# Patient Record
Sex: FEMALE | Race: BLACK OR AFRICAN AMERICAN | NOT HISPANIC OR LATINO | Employment: UNEMPLOYED | URBAN - METROPOLITAN AREA
[De-identification: names, ages, dates, MRNs, and addresses within clinical notes are randomized per-mention and may not be internally consistent; named-entity substitution may affect disease eponyms.]

---

## 2018-04-27 LAB — POC BETA-HCG (QUAL): NEGATIVE

## 2019-11-08 LAB — POC BETA-HCG (QUAL): NEGATIVE

## 2021-04-16 LAB — POC BETA-HCG (QUAL): NEGATIVE

## 2022-04-11 ENCOUNTER — HISTORICAL (OUTPATIENT)
Dept: ADMINISTRATIVE | Facility: HOSPITAL | Age: 32
End: 2022-04-11

## 2022-04-28 VITALS
DIASTOLIC BLOOD PRESSURE: 71 MMHG | BODY MASS INDEX: 30.11 KG/M2 | OXYGEN SATURATION: 95 % | HEIGHT: 64 IN | WEIGHT: 176.38 LBS | SYSTOLIC BLOOD PRESSURE: 101 MMHG

## 2022-09-21 ENCOUNTER — HISTORICAL (OUTPATIENT)
Dept: ADMINISTRATIVE | Facility: HOSPITAL | Age: 32
End: 2022-09-21

## 2023-06-01 ENCOUNTER — HOSPITAL ENCOUNTER (EMERGENCY)
Facility: HOSPITAL | Age: 33
Discharge: HOME | End: 2023-06-01
Attending: EMERGENCY MEDICINE
Payer: COMMERCIAL

## 2023-06-01 VITALS
SYSTOLIC BLOOD PRESSURE: 136 MMHG | HEART RATE: 67 BPM | RESPIRATION RATE: 18 BRPM | OXYGEN SATURATION: 100 % | WEIGHT: 185 LBS | HEIGHT: 63 IN | DIASTOLIC BLOOD PRESSURE: 87 MMHG | TEMPERATURE: 97 F | BODY MASS INDEX: 32.78 KG/M2
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
## 2023-06-01 DIAGNOSIS — S46.912A STRAIN OF LEFT SHOULDER, INITIAL ENCOUNTER: ICD-10-CM

## 2023-06-01 PROCEDURE — 63600175 PHARM REV CODE 636 W HCPCS: Performed by: EMERGENCY MEDICINE

## 2023-06-01 PROCEDURE — 96372 THER/PROPH/DIAG INJ SC/IM: CPT | Performed by: EMERGENCY MEDICINE

## 2023-06-01 PROCEDURE — 99284 EMERGENCY DEPT VISIT MOD MDM: CPT

## 2023-06-01 RX ORDER — CYCLOBENZAPRINE HCL 10 MG
10 TABLET ORAL 3 TIMES DAILY PRN
Qty: 15 TABLET | Refills: 0 | Status: SHIPPED | OUTPATIENT
Start: 2023-06-01 | End: 2023-06-06

## 2023-06-01 RX ORDER — DICLOFENAC SODIUM 10 MG/G
2 GEL TOPICAL
COMMUNITY
Start: 2023-04-23

## 2023-06-01 RX ORDER — KETOROLAC TROMETHAMINE 30 MG/ML
30 INJECTION, SOLUTION INTRAMUSCULAR; INTRAVENOUS
Status: COMPLETED | OUTPATIENT
Start: 2023-06-01 | End: 2023-06-01

## 2023-06-01 RX ADMIN — KETOROLAC TROMETHAMINE 30 MG: 30 INJECTION, SOLUTION INTRAMUSCULAR; INTRAVENOUS at 10:06
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
## 2024-04-24 DIAGNOSIS — M79.672 LEFT FOOT PAIN: Primary | ICD-10-CM

## 2024-05-30 ENCOUNTER — OFFICE VISIT (OUTPATIENT)
Dept: ORTHOPEDICS | Facility: CLINIC | Age: 34
End: 2024-05-30

## 2024-05-30 ENCOUNTER — HOSPITAL ENCOUNTER (OUTPATIENT)
Dept: RADIOLOGY | Facility: HOSPITAL | Age: 34
Discharge: HOME | End: 2024-05-30
Attending: FAMILY MEDICINE

## 2024-05-30 VITALS
HEART RATE: 73 BPM | SYSTOLIC BLOOD PRESSURE: 127 MMHG | WEIGHT: 185.63 LBS | DIASTOLIC BLOOD PRESSURE: 84 MMHG | BODY MASS INDEX: 32.89 KG/M2 | HEIGHT: 63 IN

## 2024-05-30 DIAGNOSIS — M76.62 ACHILLES TENDINITIS OF LEFT LOWER EXTREMITY: Primary | ICD-10-CM

## 2024-05-30 DIAGNOSIS — M79.672 LEFT FOOT PAIN: ICD-10-CM

## 2024-05-30 PROCEDURE — 73630 X-RAY EXAM OF FOOT: CPT | Mod: TC,LT

## 2024-05-30 PROCEDURE — 99213 OFFICE O/P EST LOW 20 MIN: CPT | Mod: PBBFAC,25

## 2024-05-30 RX ORDER — LORATADINE 10 MG/1
10 TABLET ORAL
COMMUNITY
Start: 2024-04-18

## 2024-05-30 RX ORDER — NAPROXEN 500 MG/1
500 TABLET ORAL 2 TIMES DAILY WITH MEALS
Qty: 60 TABLET | Refills: 0 | Status: SHIPPED | OUTPATIENT
Start: 2024-05-30 | End: 2024-06-29

## 2024-05-30 RX ORDER — MELOXICAM 7.5 MG/1
7.5 TABLET ORAL
COMMUNITY
Start: 2024-04-17

## 2024-05-30 NOTE — PROGRESS NOTES
"Subjective:    Patient ID: Jocelyn Schreiber is a 33 y.o. female  who presented to [facility] for new visit.      Chief Complaint: Pain of the Left Foot      History of Present Illness:  Jocelyn Schreiber presents to the clinic complaining of acute left atraumatic foot pain onset 2 months ago. Pain is currently located at the distal insertion of the left Achilles tendon, 9/10, constant throughout the day, worse with ambulation, and improves with rest. Treatment to date: mobic 7.5mg. Imaging to date: radiograph.  She admits that she has been working as a  for the past 6 year where he performs significant amount of prolonged ambulation which exacerbates her symptoms.  Expectations today:  Pain relief.    Ankle/Foot Review of Systems:  Swelling?  no  Instability?  no  Mechanical sx?  no  <30 min AM stiffness? no  Limited ROM? no  Fever/Chills? no       Objective:      Physical Exam:    /84   Pulse 73   Ht 5' 3" (1.6 m)   Wt 84.2 kg (185 lb 10 oz)   BMI 32.88 kg/m²     Ortho/SPM Exam    Appearance:  Normal gait/station  FWB      Soft tissue swelling: Left: no Right: no  Effusion: Left:  Negative Right: Negative  Erythema: Left no Right: no  Ecchymosis: Left: no Right: no  Atrophy: Left: no Right: no    Palpation:  Ankle/Foot Tenderness: Left:  Distal insertion of the Achilles tendon Right: non tender.    Range of motion:  Ankle dorsiflexion (20 degrees):     Left: 20 Right: 20  Ankle Plantar flexion (50 degrees): Left 50 Right: 50  Subtalar inversion (5 degrees): Left: 5 Right 5  Subtalar eversion (5 degrees):  Left: 5 Right 5  Transverse/midtarsal: adduction (20 degrees): Left: 20 Right: 20  Transverse/midtarsal abduction (10 degrees): Left: 10 Right: 10    Strength:  Foot inversion/dorsiflexion (Deep Peroneal N. L4):Left: 5/5  Pain: no Right: 5/5  Pain: no  1st toe Dorsiflexion (Deep Peroneal N. L5): Left: 5/5  Pain: no Right: 5/5  Pain: " no  Foot plantarflexion (Tibial N. S1): Left: 5/5  Pain: yes Right: 5/5  Pain: no  Foot eversion (superficial peroneal L4-S1): Left: 5/5  Pain: no Right: 5/5  Pain: no      Special Tests:  Mehta:  Left: Negative     Right: Negative   Anterior drawer: Left: Negative     Right: Negative   Talar tilt: Left: Negative      Right: Negative   External rotation stress (Kleiger's): Left: Negative  Right: Negative  Eversion stress: Left: Negative Right: Negative   Squeeze: Left: Negative  Right: Negative  Heel rise: Left: Not performed Right: Not performed  Too many toes sign: Left: Not performed Right: Not performed  Negative left windlass test.      General appearance: NAD  Peripheral pulses: normal bilaterally   Reflexes: Left: normal Right normal   Sensation: normal    Labs:  Last A1c: The patient doesn't have any registry metric data available     Imaging:   Previous images reviewed.  X-rays ordered and performed today: yes  # of views: 3 Laterality: left  My Interpretation:  Small enthesophyte noted at the distal insertion of the Achilles tendon.  No acute fractures or dislocations noted.      Assessment:        Encounter Diagnoses   Code Name Primary?    M76.62 Achilles tendinitis of left lower extremity Yes        Plan:    MDM: Prior external referring provider notes reviewed. Prior external referring provider studies reviewed.   Dx:  Left achilles tendonitis, new problem  Treatment Plan: Discussed with patient diagnosis, prognosis, and treatment recommendations. Education provided.  Patient meets nonsurgical criteria. Discussed conservative management including avoiding aggravating activities, activity modification, rest, discontinue mobic and start naproxen, heel lift, insoles, cold and hot therapy, HEP, formal PT, and to follow up with us in 8 weeks.  Patient declined formal PT.  Imaging: radiological studies ordered and independently reviewed; discussed with patient; pending radiologist interpretation.   Weight  Management: is paramount. Recommend at least 10% of total body weight loss if your BMI is 30-34.9. A BMI of <24.9 may provide further relief..   Activity: Activity as tolerated; HEP to include aerobic conditioning and strength training with non-painful activity. ROM/STG exercises. Proper footware; assistive devises to avoid limping.   Therapy: No formal therapy  Medication: START naproxen 500 mg every 12 hours as needed for pain . Please see your primary care physician for further refills.  RTC:  8 weeks.        This note is dictated using the M*Modal Fluency Direct word recognition program. There are word recognition mistakes that are occasionally missed on review.    Osvaldo Goins D.O.  Sports Medicine Fellow

## 2024-05-30 NOTE — LETTER
May 30, 2024      [facility]  [facility] [facility] [address]  Phone: [phone]       Patient: Jocelyn Schreiber   YOB: 1990  Date of Visit: 05/30/2024    To Whom It May Concern:    Nancy Schreiber  was at [facility] on 05/30/2024. The patient may return to work/school on 05/30/24 Patient is to be on light duty until next visit. We ask that she follow our recommendation of no prolonged standing/walking for longer than 10-15 minutes светлана  time. We also ask you to allow her to have frequent breaks between standing/walking as well. If you have any questions or concerns, or if I can be of further assistance, please do not hesitate to contact me.    Sincerely,  MD Mary Espitia MA

## 2024-05-31 NOTE — PROGRESS NOTES
Faculty Attestation: Jocelyn AZAEL Schreiber  was seen in [facility] Clinic. Discussed with Dr. Goins at the time of the visit. History of Present Illness, Physical Exam, and Assessment and Plan reviewed. Treatment plan is reasonable and appropriate. Compliance with treatment recommendations is important.  Radiology images independently reviewed and agree with radiologist interpretation. No procedure was performed.     Maday Reyes MD  Sports Medicine

## 2024-07-30 ENCOUNTER — OFFICE VISIT (OUTPATIENT)
Dept: ORTHOPEDICS | Facility: CLINIC | Age: 34
End: 2024-07-30

## 2024-07-30 VITALS
HEIGHT: 63 IN | HEART RATE: 76 BPM | TEMPERATURE: 98 F | DIASTOLIC BLOOD PRESSURE: 74 MMHG | BODY MASS INDEX: 33.25 KG/M2 | WEIGHT: 187.63 LBS | SYSTOLIC BLOOD PRESSURE: 109 MMHG

## 2024-07-30 DIAGNOSIS — M76.62 ACHILLES TENDINITIS OF LEFT LOWER EXTREMITY: Primary | ICD-10-CM

## 2024-07-30 PROCEDURE — 99213 OFFICE O/P EST LOW 20 MIN: CPT | Mod: PBBFAC

## 2024-07-30 RX ORDER — ERGOCALCIFEROL 1.25 MG/1
50000 CAPSULE ORAL
COMMUNITY
Start: 2024-05-02

## 2024-07-30 NOTE — PROGRESS NOTES
"Subjective:    Patient ID: Jocelyn Schreiber is a 33 y.o. female  who presented to [facility] for follow up.      Chief Complaint: Pain of the Left Foot      History of Present Illness:  Jocelyn Schreiber presents to the clinic complaining left atraumatic foot pain onset 4 months ago.  She was last seen by me on May 3, 2024 and was instructed to wear insoles/heel lift and start naproxen, she declined formal physical therapy at that time. She admits to compliance with insoles and heel lift. She admits her pain has overall has improved. Her pain is currently 7/10 at the distal insertion of the left achilles tendon, worse with ambulation, and improves with rest, insoles, and naproxen.  Treatment today: insoles, heel lift, Mobic and home exercise program.  Imaging to date: Radiographs.  She admits overall her pain has improved.    Ankle/Foot Review of Systems:  Swelling?  no  Instability?  no  Mechanical sx?  no  <30 min AM stiffness? no  Limited ROM? no  Fever/Chills? no       Objective:      Physical Exam:    /74   Pulse 76   Temp 97.6 °F (36.4 °C)   Ht 5' 3" (1.6 m)   Wt 85.1 kg (187 lb 9.8 oz)   BMI 33.23 kg/m²     Ortho/SPM Exam    Appearance:  Normal gait/station  FWB      Soft tissue swelling: Left: no Right: no  Effusion: Left:  Negative Right: Negative  Erythema: Left no Right: no  Ecchymosis: Left: no Right: no  Atrophy: Left: no Right: no    Palpation:  Ankle/Foot Tenderness: Left:  Distal insertion of the Achilles tendon Right: non tender.    Range of motion:  Ankle dorsiflexion (20 degrees):     Left: 20 Right: 20  Ankle Plantar flexion (50 degrees): Left 50 Right: 50  Subtalar inversion (5 degrees): Left: 5 Right 5  Subtalar eversion (5 degrees):  Left: 5 Right 5  Transverse/midtarsal: adduction (20 degrees): Left: 20 Right: 20  Transverse/midtarsal abduction (10 degrees): Left: 10 Right: 10    Strength:  Foot inversion/dorsiflexion (Deep " Peroneal N. L4):Left: 5/5  Pain: no Right: 5/5  Pain: no  1st toe Dorsiflexion (Deep Peroneal N. L5): Left: 5/5  Pain: no Right: 5/5  Pain: no  Foot plantarflexion (Tibial N. S1): Left: 5/5  Pain: no Right: 5/5  Pain: no  Foot eversion (superficial peroneal L4-S1): Left: 5/5  Pain: no Right: 5/5  Pain: no      Special Tests:  Mehta:  Left: Not performed     Right: Not performed   Anterior drawer: Left: Negative     Right: Negative   Talar tilt: Left: Negative      Right: Negative   External rotation stress (Kleiger's): Left: Negative  Right: Negative  Eversion stress: Left: Negative Right: Negative   Squeeze: Left: Negative  Right: Negative  Heel rise: Left: Not performed Right: Not performed  Too many toes sign: Left: Not performed Right: Not performed      General appearance: NAD  Peripheral pulses: normal bilaterally   Reflexes: Left: normal Right normal   Sensation: normal    Labs:  Last A1c: The patient doesn't have any registry metric data available     Imaging:   Previous images reviewed.  X-rays ordered and performed today: no      My interpretation of the left foot x-ray from May 3, 2024: Small enthesophyte noted at the distal insertion of the Achilles tendon.  No acute fractures or dislocations noted.    Assessment:        Encounter Diagnoses   Code Name Primary?    M76.62 Achilles tendinitis of left lower extremity Yes        Plan:    Dx:  Left Achilles tendonitis, overall improving    Treatment Plan: Discussed with patient diagnosis, prognosis, and treatment recommendations. Education provided.  Discussed conservative management including avoiding aggravating activities, activity modification, oral/topical analgesics, cold and hot therapy, HEP, formal PT, and to follow up with us as needed.  Declined formal physical therapy.  She is self-pay and we have discussed alternative treatment options such as PRP with Dr. Lora and MATT.  Imaging: prior radiological studies independently reviewed; discussed  with patient; agree with radiologist interpretation.   Weight Management: is paramount. Recommend at least 10% of total body weight loss if your BMI is 30-34.9. A BMI of <24.9 may provide further relief..   Activity: Activity as tolerated; HEP to include aerobic conditioning and strength training with non-painful activity. ROM/STG exercises. Proper footware; assistive devises to avoid limping.  Continue heel lift/.  insoles  Therapy:  Declined formal PT.  Continue home exercise program.  Medication: START over-the-counter acetaminophen (Tylenol 1000 mg three times per day as needed)  CONTINUE previously prescribed medication Naproxen 500mg q12h prn for pain . Please see your primary care physician for further refills.  RTC: PRN; call if any issues.        This note is dictated using the M*Modal Fluency Direct word recognition program. There are word recognition mistakes that are occasionally missed on review.    Osvaldo Goins D.O.  Sports Medicine Fellow

## 2024-07-30 NOTE — LETTER
July 30, 2024      [facility]  [address] [place] [address]  Phone: [phone]       Patient: Jocelyn Schreiber   YOB: 1990  Date of Visit: 07/30/2024    To Whom It May Concern:    Nancy Schreiber  was at [facility] on 07/30/2024. The patient may return to work 7/30/24. If you have any questions or concerns, or if I can be of further assistance, please do not hesitate to contact me.    Sincerely,  Osvaldo Goins, DO      17-Nov-2022 03:03

## 2025-01-13 ENCOUNTER — HOSPITAL ENCOUNTER (EMERGENCY)
Facility: HOSPITAL | Age: 35
Discharge: HOME | End: 2025-01-13
Attending: EMERGENCY MEDICINE
Payer: COMMERCIAL

## 2025-01-13 VITALS
DIASTOLIC BLOOD PRESSURE: 88 MMHG | BODY MASS INDEX: 32.25 KG/M2 | HEART RATE: 100 BPM | SYSTOLIC BLOOD PRESSURE: 133 MMHG | HEIGHT: 63 IN | WEIGHT: 182 LBS | OXYGEN SATURATION: 100 % | RESPIRATION RATE: 16 BRPM | TEMPERATURE: 98 F

## 2025-01-13 DIAGNOSIS — T07.XXXA MULTIPLE ABRASIONS: ICD-10-CM

## 2025-01-13 DIAGNOSIS — S43.402A SPRAIN OF LEFT SHOULDER, UNSPECIFIED SHOULDER SPRAIN TYPE, INITIAL ENCOUNTER: Primary | ICD-10-CM

## 2025-01-13 DIAGNOSIS — S60.219A: ICD-10-CM
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
## 2025-01-13 LAB — B-HCG UR QL: NEGATIVE

## 2025-01-13 PROCEDURE — 25000003 PHARM REV CODE 250: Performed by: EMERGENCY MEDICINE

## 2025-01-13 PROCEDURE — 99283 EMERGENCY DEPT VISIT LOW MDM: CPT | Mod: 25

## 2025-01-13 PROCEDURE — 81025 URINE PREGNANCY TEST: CPT | Performed by: EMERGENCY MEDICINE

## 2025-01-13 RX ORDER — IBUPROFEN 800 MG/1
800 TABLET ORAL EVERY 8 HOURS PRN
Qty: 20 TABLET | Refills: 0 | Status: SHIPPED | OUTPATIENT
Start: 2025-01-13

## 2025-01-13 RX ORDER — HYDROCODONE BITARTRATE AND ACETAMINOPHEN 10; 325 MG/1; MG/1
1 TABLET ORAL
Status: COMPLETED | OUTPATIENT
Start: 2025-01-13 | End: 2025-01-13

## 2025-01-13 RX ORDER — HYDROCODONE BITARTRATE AND ACETAMINOPHEN 5; 325 MG/1; MG/1
1 TABLET ORAL EVERY 6 HOURS PRN
Qty: 6 TABLET | Refills: 0 | Status: SHIPPED | OUTPATIENT
Start: 2025-01-13

## 2025-01-13 RX ORDER — ONDANSETRON 4 MG/1
4 TABLET, ORALLY DISINTEGRATING ORAL
Status: COMPLETED | OUTPATIENT
Start: 2025-01-13 | End: 2025-01-13

## 2025-01-13 RX ADMIN — ONDANSETRON 4 MG: 4 TABLET, ORALLY DISINTEGRATING ORAL at 04:01

## 2025-01-13 RX ADMIN — HYDROCODONE BITARTRATE AND ACETAMINOPHEN 1 TABLET: 10; 325 TABLET ORAL at 03:01
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
## 2025-01-13 NOTE — Clinical Note
"Jocelyn Gómezenriqueta Schreiber was seen and treated in our emergency department on 1/13/2025.  She may return to work on 01/16/2025.       If you have any questions or concerns, please don't hesitate to call.      Codi CHAVIRA    "

## 2025-06-18 DIAGNOSIS — R87.612 LGSIL ON PAP SMEAR OF CERVIX: Primary | ICD-10-CM

## 2025-06-18 DIAGNOSIS — B97.7 HPV (HUMAN PAPILLOMA VIRUS) INFECTION: ICD-10-CM

## 2025-07-30 ENCOUNTER — HOSPITAL ENCOUNTER (EMERGENCY)
Facility: HOSPITAL | Age: 35
Discharge: HOME | End: 2025-07-30
Attending: EMERGENCY MEDICINE

## 2025-07-30 VITALS
DIASTOLIC BLOOD PRESSURE: 75 MMHG | WEIGHT: 180 LBS | HEIGHT: 64 IN | SYSTOLIC BLOOD PRESSURE: 114 MMHG | HEART RATE: 68 BPM | RESPIRATION RATE: 18 BRPM | OXYGEN SATURATION: 99 % | BODY MASS INDEX: 30.73 KG/M2 | TEMPERATURE: 98 F

## 2025-07-30 DIAGNOSIS — M94.0 COSTOCHONDRITIS: ICD-10-CM

## 2025-07-30 DIAGNOSIS — R07.89 CHEST WALL PAIN: Primary | ICD-10-CM

## 2025-07-30 DIAGNOSIS — R07.9 CHEST PAIN: ICD-10-CM

## 2025-07-30 LAB
ALBUMIN SERPL-MCNC: 3.8 G/DL (ref 3.5–5)
ALBUMIN/GLOB SERPL: 0.9 RATIO (ref 1.1–2)
ALP SERPL-CCNC: 85 UNIT/L (ref 40–150)
ALT SERPL-CCNC: 12 UNIT/L (ref 0–55)
ANION GAP SERPL CALC-SCNC: 6 MEQ/L
AST SERPL-CCNC: 16 UNIT/L (ref 11–45)
B-HCG UR QL: NEGATIVE
BASOPHILS # BLD AUTO: 0.04 X10(3)/MCL
BASOPHILS NFR BLD AUTO: 0.5 %
BILIRUB SERPL-MCNC: 0.4 MG/DL
BUN SERPL-MCNC: 9.6 MG/DL (ref 7–18.7)
CALCIUM SERPL-MCNC: 8.9 MG/DL (ref 8.4–10.2)
CHLORIDE SERPL-SCNC: 108 MMOL/L (ref 98–107)
CK SERPL-CCNC: 74 U/L (ref 29–168)
CO2 SERPL-SCNC: 25 MMOL/L (ref 22–29)
CREAT SERPL-MCNC: 0.67 MG/DL (ref 0.55–1.02)
CREAT/UREA NIT SERPL: 14
D DIMER PPP IA.FEU-MCNC: <=0.27 UG/ML FEU (ref 0–0.5)
EOSINOPHIL # BLD AUTO: 0.11 X10(3)/MCL (ref 0–0.9)
EOSINOPHIL NFR BLD AUTO: 1.3 %
ERYTHROCYTE [DISTWIDTH] IN BLOOD BY AUTOMATED COUNT: 13.7 % (ref 11.5–17)
GFR SERPLBLD CREATININE-BSD FMLA CKD-EPI: >60 ML/MIN/1.73/M2
GLOBULIN SER-MCNC: 4.2 GM/DL (ref 2.4–3.5)
GLUCOSE SERPL-MCNC: 86 MG/DL (ref 74–100)
HCT VFR BLD AUTO: 35.3 % (ref 37–47)
HGB BLD-MCNC: 11.7 G/DL (ref 12–16)
IMM GRANULOCYTES # BLD AUTO: 0.01 X10(3)/MCL (ref 0–0.04)
IMM GRANULOCYTES NFR BLD AUTO: 0.1 %
INR PPP: 1.3
LYMPHOCYTES # BLD AUTO: 3.18 X10(3)/MCL (ref 0.6–4.6)
LYMPHOCYTES NFR BLD AUTO: 38.6 %
MCH RBC QN AUTO: 29.2 PG (ref 27–31)
MCHC RBC AUTO-ENTMCNC: 33.1 G/DL (ref 33–36)
MCV RBC AUTO: 88 FL (ref 80–94)
MONOCYTES # BLD AUTO: 0.61 X10(3)/MCL (ref 0.1–1.3)
MONOCYTES NFR BLD AUTO: 7.4 %
NEUTROPHILS # BLD AUTO: 4.28 X10(3)/MCL (ref 2.1–9.2)
NEUTROPHILS NFR BLD AUTO: 52.1 %
NRBC BLD AUTO-RTO: 0 %
NT-PROBNP SERPL-MCNC: <16 PG/ML
OHS QRS DURATION: 82 MS
OHS QTC CALCULATION: 451 MS
PLATELET # BLD AUTO: 433 X10(3)/MCL (ref 130–400)
PMV BLD AUTO: 9 FL (ref 7.4–10.4)
POTASSIUM SERPL-SCNC: 3.6 MMOL/L (ref 3.5–5.1)
PROT SERPL-MCNC: 8 GM/DL (ref 6.4–8.3)
PROTHROMBIN TIME: 16.4 SECONDS (ref 11.7–14.5)
RBC # BLD AUTO: 4.01 X10(6)/MCL (ref 4.2–5.4)
SODIUM SERPL-SCNC: 139 MMOL/L (ref 136–145)
TROPONIN I SERPL HS-MCNC: <3 NG/L
WBC # BLD AUTO: 8.23 X10(3)/MCL (ref 4.5–11.5)

## 2025-07-30 PROCEDURE — 85025 COMPLETE CBC W/AUTO DIFF WBC: CPT | Performed by: EMERGENCY MEDICINE

## 2025-07-30 PROCEDURE — 83880 ASSAY OF NATRIURETIC PEPTIDE: CPT | Performed by: EMERGENCY MEDICINE

## 2025-07-30 PROCEDURE — 93005 ELECTROCARDIOGRAM TRACING: CPT

## 2025-07-30 PROCEDURE — 81025 URINE PREGNANCY TEST: CPT | Performed by: EMERGENCY MEDICINE

## 2025-07-30 PROCEDURE — 80053 COMPREHEN METABOLIC PANEL: CPT | Performed by: EMERGENCY MEDICINE

## 2025-07-30 PROCEDURE — 82550 ASSAY OF CK (CPK): CPT | Performed by: EMERGENCY MEDICINE

## 2025-07-30 PROCEDURE — 99285 EMERGENCY DEPT VISIT HI MDM: CPT | Mod: 25

## 2025-07-30 PROCEDURE — 85610 PROTHROMBIN TIME: CPT | Performed by: EMERGENCY MEDICINE

## 2025-07-30 PROCEDURE — 63600175 PHARM REV CODE 636 W HCPCS: Performed by: EMERGENCY MEDICINE

## 2025-07-30 PROCEDURE — 96375 TX/PRO/DX INJ NEW DRUG ADDON: CPT

## 2025-07-30 PROCEDURE — 96374 THER/PROPH/DIAG INJ IV PUSH: CPT

## 2025-07-30 PROCEDURE — 84484 ASSAY OF TROPONIN QUANT: CPT | Performed by: EMERGENCY MEDICINE

## 2025-07-30 PROCEDURE — 93010 ELECTROCARDIOGRAM REPORT: CPT | Mod: ,,, | Performed by: INTERNAL MEDICINE

## 2025-07-30 PROCEDURE — 85379 FIBRIN DEGRADATION QUANT: CPT | Performed by: EMERGENCY MEDICINE

## 2025-07-30 RX ORDER — KETOROLAC TROMETHAMINE 30 MG/ML
30 INJECTION, SOLUTION INTRAMUSCULAR; INTRAVENOUS
Status: COMPLETED | OUTPATIENT
Start: 2025-07-30 | End: 2025-07-30

## 2025-07-30 RX ORDER — HYDROMORPHONE HYDROCHLORIDE 2 MG/ML
1 INJECTION, SOLUTION INTRAMUSCULAR; INTRAVENOUS; SUBCUTANEOUS
Refills: 0 | Status: COMPLETED | OUTPATIENT
Start: 2025-07-30 | End: 2025-07-30

## 2025-07-30 RX ORDER — METHOCARBAMOL 500 MG/1
1000 TABLET, FILM COATED ORAL 3 TIMES DAILY
Qty: 30 TABLET | Refills: 0 | Status: SHIPPED | OUTPATIENT
Start: 2025-07-30 | End: 2025-08-04

## 2025-07-30 RX ORDER — KETOROLAC TROMETHAMINE 10 MG/1
10 TABLET, FILM COATED ORAL EVERY 6 HOURS
Qty: 20 TABLET | Refills: 0 | Status: SHIPPED | OUTPATIENT
Start: 2025-07-30 | End: 2025-08-04

## 2025-07-30 RX ORDER — MORPHINE SULFATE 4 MG/ML
4 INJECTION, SOLUTION INTRAMUSCULAR; INTRAVENOUS
Refills: 0 | Status: COMPLETED | OUTPATIENT
Start: 2025-07-30 | End: 2025-07-30

## 2025-07-30 RX ORDER — ONDANSETRON HYDROCHLORIDE 2 MG/ML
4 INJECTION, SOLUTION INTRAVENOUS
Status: COMPLETED | OUTPATIENT
Start: 2025-07-30 | End: 2025-07-30

## 2025-07-30 RX ADMIN — HYDROMORPHONE HYDROCHLORIDE 1 MG: 2 INJECTION INTRAMUSCULAR; INTRAVENOUS; SUBCUTANEOUS at 11:07

## 2025-07-30 RX ADMIN — MORPHINE SULFATE 4 MG: 4 INJECTION INTRAVENOUS at 11:07

## 2025-07-30 RX ADMIN — ONDANSETRON HYDROCHLORIDE 4 MG: 2 SOLUTION INTRAMUSCULAR; INTRAVENOUS at 11:07

## 2025-07-30 RX ADMIN — KETOROLAC TROMETHAMINE 30 MG: 30 INJECTION, SOLUTION INTRAMUSCULAR; INTRAVENOUS at 12:07

## 2025-07-30 NOTE — Clinical Note
"Jocelyn"Caty Schreiber was seen and treated in our emergency department on 7/30/2025.  She may return to work on 08/04/2025.       If you have any questions or concerns, please don't hesitate to call.      Pily Marcelino MD"

## 2025-07-30 NOTE — ED TRIAGE NOTES
C/o non-traumatic sub-sternal chest pain since 0545 this am. Pain is reproducible. Denies any recent illness.

## 2025-07-30 NOTE — ED PROVIDER NOTES
"Encounter Date: 2025       History     Chief Complaint   Patient presents with    Chest Pain     Patient is a 34-year-old female presenting with right-sided chest pain.  Patient woke at approximately 5:30 a.m. this morning feeling like there was pain in her chest.  She initially thought she was just sleeping on something firm when she rolled over she and had a sudden onset worsening pain.  It is worse to move, take deep breath in.  She has never had this happen before.  She does have some associated shortness breath.  There was no radiation into her arm or into her back.  She denies any recent fevers, chills, abdominal pain, vomiting, nausea, leg swelling, calf pain.  She did recently come back from [place] but her plane ride was less than 1 hour long.  She is not on any medications she did drive herself here but has family member coming.  She has no past medical history other than chronic knee pain.  She is not on any current medications.  She has no first-degree relatives with cardiac history.  She does have a 1st cousin though who  suddenly of a "heart attack" at age 31.        Review of patient's allergies indicates:  No Known Allergies  Past Medical History:   Diagnosis Date    Knee pain, chronic      History reviewed. No pertinent surgical history.  Family History   Family history unknown: Yes     Social History[1]  Review of Systems   All other systems reviewed and are negative.      Physical Exam     Initial Vitals [25 1037]   BP Pulse Resp Temp SpO2   117/86 82 16 98.3 °F (36.8 °C) 97 %      MAP       --         Physical Exam    Nursing note and vitals reviewed.  Constitutional: She appears well-developed and well-nourished. No distress.   HENT:   Head: Normocephalic and atraumatic.   Eyes: Conjunctivae are normal. Pupils are equal, round, and reactive to light.   Neck: Neck supple.   Cardiovascular:  Normal rate, regular rhythm and normal heart sounds.           No murmur " heard.  Pulmonary/Chest: Breath sounds normal. No respiratory distress. She has no wheezes. She has no rhonchi.   Abdominal: Abdomen is soft. Bowel sounds are normal. She exhibits no distension. There is no abdominal tenderness. There is no rebound and no guarding.   Musculoskeletal:         General: No edema. Normal range of motion.      Cervical back: Neck supple.     Neurological: She is alert and oriented to person, place, and time.   Skin: Skin is warm and dry.   Psychiatric: She has a normal mood and affect. Thought content normal.         ED Course   Procedures  Labs Reviewed   COMPREHENSIVE METABOLIC PANEL - Abnormal       Result Value    Sodium 139      Potassium 3.6      Chloride 108 (*)     CO2 25      Glucose 86      Blood Urea Nitrogen 9.6      Creatinine 0.67      Calcium 8.9      Protein Total 8.0      Albumin 3.8      Globulin 4.2 (*)     Albumin/Globulin Ratio 0.9 (*)     Bilirubin Total 0.4      ALP 85      ALT 12      AST 16      eGFR >60      Anion Gap 6.0      BUN/Creatinine Ratio 14     PROTIME-INR - Abnormal    PT 16.4 (*)     INR 1.3      Narrative:     Protimes are used to monitor anticoagulant agents such as warfarin. PT INR values are based on the current patient normal mean and the GLENN value for the specific instrument reagent used.  **Routine theraputic target values for the INR are 2.0-3.0**   CBC WITH DIFFERENTIAL - Abnormal    WBC 8.23      RBC 4.01 (*)     Hgb 11.7 (*)     Hct 35.3 (*)     MCV 88.0      MCH 29.2      MCHC 33.1      RDW 13.7      Platelet 433 (*)     MPV 9.0      Neut % 52.1      Lymph % 38.6      Mono % 7.4      Eos % 1.3      Basophil % 0.5      Imm Grans % 0.1      Neut # 4.28      Lymph # 3.18      Mono # 0.61      Eos # 0.11      Baso # 0.04      Imm Gran # 0.01      NRBC% 0.0     NT-PRO NATRIURETIC PEPTIDE - Normal    NT-proBNP <16      Narrative:     NOTE:  Access complete set of age - and/or gender-specific reference intervals for this test in the [facility]  Laboratory Collection Manual.   TROPONIN I HIGH SENSITIVITY - Normal    Troponin High Sensitive <3     D DIMER, QUANTITATIVE - Normal    D-Dimer <=0.27     PREGNANCY TEST, URINE RAPID - Normal    hCG Qualitative, Urine Negative     CK - Normal    Creatine Kinase 74     CBC W/ AUTO DIFFERENTIAL    Narrative:     The following orders were created for panel order CBC auto differential.  Procedure                               Abnormality         Status                     ---------                               -----------         ------                     CBC with Differential[[id number]]       Abnormal            Final result                 Please view results for these tests on the individual orders.     EKG Readings: (Independently Interpreted)   EKG Interpretation 10:42 AM    Rate: 79  Rhythm: NSR  QRS: WNL  Qtc: WNL  No STEMI  Nonspecific T wave abnormalities present with T wave inversion in V1, V3  No priors for comparison       ECG Results              EKG 12-lead (Final result)        Collection Time Result Time QRS Duration [facility] QTC Calculation    07/30/25 10:39:32 07/30/25 19:05:38 82 451                     Final result by Interface, Lab In [facility] (07/30/25 19:05:43)                   Narrative:    Test Reason : R07.9,    Vent. Rate :  79 BPM     Atrial Rate :  79 BPM     P-R Int : 140 ms          QRS Dur :  82 ms      QT Int : 394 ms       P-R-T Axes :  74  57  57 degrees    QTcB Int : 451 ms    Normal sinus rhythm with sinus arrhythmia  Nonspecific T wave abnormality  Abnormal ECG  No previous ECGs available  Confirmed by Guru Terry ([id number]) on 7/30/2025 7:05:34 PM    Referred By:            Confirmed By: Guru Terry                                  Imaging Results              X-Ray Chest AP Portable (Final result)  Result time 07/30/25 12:21:34      Final result by Amadeo Gastelum MD (07/30/25 12:21:34)                   Impression:      No acute findings.      Electronically signed by: Amadeo  Oriana  Date:    07/30/2025  Time:    12:21               Narrative:    EXAMINATION:  XR CHEST AP PORTABLE    CLINICAL HISTORY:  Chest Pain;    COMPARISON:  No priors    FINDINGS:  Frontal view of the chest was obtained. The heart is not enlarged.  Lungs are clear.  There is no pneumothorax or significant effusion.                                       Medications   morphine injection 4 mg (4 mg Intravenous Given 7/30/25 1100)   ondansetron injection 4 mg (4 mg Intravenous Given 7/30/25 1100)   HYDROmorphone (PF) injection 1 mg (1 mg Intravenous Given 7/30/25 1134)   ketorolac injection 30 mg (30 mg Intravenous Given 7/30/25 1252)     Medical Decision Making  Differentials considered but not limited to Myocardial ischemia, pericarditis, pulmonary embolus, chest wall pain, pleural inflammation and pulmonary infectious causes.    NO acute findings. No emergent cause found for her CP. She did get significant improvement with toradol. Results were reviewed with patient. Questions were answered along with a discussion of signs and symptoms to return for. Patient comfortable with plan of discharge.      Problems Addressed:  Chest wall pain: acute illness or injury  Costochondritis: acute illness or injury    Amount and/or Complexity of Data Reviewed  Labs: ordered. Decision-making details documented in ED Course.  Radiology: ordered. Decision-making details documented in ED Course.    Risk  Prescription drug management.               ED Course as of 08/02/25 0802   Wed Jul 30, 2025   1056 Chest x-ray if pregnancy test is negative. [GM]   1130 Patient still having chest pain after morphine. Dilaudid ordered.  [GM]   1142 Toradol after pregnancy test [GM]   1248 MD to re-evaluate [GM]   1335 On re-evaluation of the patient's pain is currently a 3/10 after getting the Toradol. Results were reviewed with patient. Questions were answered along with a discussion of signs and symptoms to return for. Patient comfortable with plan  of discharge.   [GM]      ED Course User Index  [GM] Pily Marcelino MD                               Clinical Impression:  Final diagnoses:  [R07.9] Chest pain  [R07.89] Chest wall pain (Primary)  [M94.0] Costochondritis          ED Disposition Condition    Discharge Stable          ED Prescriptions       Medication Sig Dispense Start Date End Date Auth. Provider    ketorolac (TORADOL) 10 mg tablet Take 1 tablet (10 mg total) by mouth every 6 (six) hours. for 5 days 20 tablet 7/30/2025 8/4/2025 Pily Marcelino MD    methocarbamoL (ROBAXIN) 500 MG Tab Take 2 tablets (1,000 mg total) by mouth 3 (three) times daily. for 5 days 30 tablet 7/30/2025 8/4/2025 Pily Marcelino MD          Follow-up Information       Follow up With Specialties Details Why Contact Info    Martir Zhou MD Internal Medicine  If symptoms worsen, return to [place] [id number]  [phone]                     [1]   Social History  Tobacco Use    Smoking status: Some Days     Types: Cigarettes    Smokeless tobacco: Never    Tobacco comments:     hookah   Substance Use Topics    Alcohol use: Not Currently     Comment: occasionally    Drug use: Never        Pily Marcelino MD  08/02/25 0804